# Patient Record
(demographics unavailable — no encounter records)

---

## 2025-03-07 NOTE — HISTORY OF PRESENT ILLNESS
Symptomatic treatment with increase in fluids and continuation of Mucinex  Numbing lozenges 
[FreeTextEntry1] : Patient following up on chronic sinusitis , PND, bilateral occasional otalgia .  Patient denies any hearing loss. Having PND. Feels mucus coming from nose to back of throat. She coughs up phlegm as well. Denies nasal congestion or use of nasal sprays. Stopped the omeprazole due to PMD stating that it affects kidneys.

## 2025-03-07 NOTE — PROCEDURE
[Recalcitrant Symptoms] : recalcitrant symptoms  [Anterior rhinoscopy insufficient to account for symptoms] : anterior rhinoscopy insufficient to account for symptoms [Flexible Endoscope] : examined with the flexible endoscope [Congested] : congested [Deviated to the Lt] : deviated to the left [de-identified] : clear mucus seen bilaterally.

## 2025-03-07 NOTE — REASON FOR VISIT
[Subsequent Evaluation] : a subsequent evaluation for [FreeTextEntry2] : chronic sinusitis , PND, bilateral occasional otalgia

## 2025-03-07 NOTE — PHYSICAL EXAM
[Normal] : mucosa is normal [Midline] : trachea located in midline position [de-identified] : B/L ear wax removed with curette